# Patient Record
Sex: FEMALE | Race: WHITE | NOT HISPANIC OR LATINO | Employment: OTHER | URBAN - METROPOLITAN AREA
[De-identification: names, ages, dates, MRNs, and addresses within clinical notes are randomized per-mention and may not be internally consistent; named-entity substitution may affect disease eponyms.]

---

## 2019-07-12 PROBLEM — H43.811 POSTERIOR VITREOUS DETACHMENT: Noted: 2019-07-12

## 2019-07-12 PROBLEM — H40.033 ANATOMIC NARROW ANGLE S/P PI: Noted: 2019-07-12

## 2019-07-12 PROBLEM — H18.511 ENDOTHELIAL CORNEAL DYSTROPHY: Noted: 2020-12-04

## 2019-09-19 PROBLEM — H43.811 POSTERIOR VITREOUS DETACHMENT: Noted: 2019-07-12

## 2019-09-19 PROBLEM — H40.033 ANATOMIC NARROW ANGLES: Noted: 2019-07-12

## 2019-09-19 PROBLEM — H18.519 ENDOTHELIAL CORNEAL DYSTROPHY: Noted: 2020-12-04

## 2020-12-04 ENCOUNTER — PREPPED CHART (OUTPATIENT)
Dept: URBAN - METROPOLITAN AREA CLINIC 47 | Facility: CLINIC | Age: 65
End: 2020-12-04

## 2020-12-04 PROBLEM — H18.511 ENDOTHELIAL CORNEAL DYSTROPHY: Noted: 2020-12-04

## 2020-12-04 PROBLEM — H40.033 ANATOMIC NARROW ANGLE S/P PI: Noted: 2019-07-12

## 2020-12-04 PROBLEM — H43.811 POSTERIOR VITREOUS DETACHMENT: Noted: 2019-07-12

## 2022-07-07 ENCOUNTER — ESTABLISHED COMPREHENSIVE EXAM (OUTPATIENT)
Dept: URBAN - METROPOLITAN AREA CLINIC 47 | Facility: CLINIC | Age: 67
End: 2022-07-07

## 2022-07-07 DIAGNOSIS — H18.511: ICD-10-CM

## 2022-07-07 DIAGNOSIS — H52.03: ICD-10-CM

## 2022-07-07 DIAGNOSIS — H40.033: ICD-10-CM

## 2022-07-07 DIAGNOSIS — H43.811: ICD-10-CM

## 2022-07-07 PROCEDURE — 92014 COMPRE OPH EXAM EST PT 1/>: CPT

## 2022-07-07 PROCEDURE — 92015 DETERMINE REFRACTIVE STATE: CPT

## 2022-07-07 ASSESSMENT — TONOMETRY
OD_IOP_MMHG: 16.0
OS_IOP_MMHG: 20.0

## 2022-07-07 ASSESSMENT — VISUAL ACUITY
OD_CC: 20/30-1
OU_CC: 20/25-3
OS_CC: 20/40
OD_CC: 20/25-1
OS_CC: 20/25-2
OU_CC: 20/20-2

## 2022-07-07 ASSESSMENT — KERATOMETRY
OD_AXISANGLE_DEGREES: 103
OS_K2POWER_DIOPTERS: 45.50
OS_K1POWER_DIOPTERS: 44.75
OD_K1POWER_DIOPTERS: 44.75
OS_AXISANGLE_DEGREES: 61
OS_AXISANGLE2_DEGREES: 151
OD_K2POWER_DIOPTERS: 47.00
OD_AXISANGLE2_DEGREES: 013

## 2023-08-24 ENCOUNTER — ESTABLISHED COMPREHENSIVE EXAM (OUTPATIENT)
Dept: URBAN - METROPOLITAN AREA CLINIC 47 | Facility: CLINIC | Age: 68
End: 2023-08-24

## 2023-08-24 DIAGNOSIS — H52.03: ICD-10-CM

## 2023-08-24 DIAGNOSIS — H43.811: ICD-10-CM

## 2023-08-24 DIAGNOSIS — H40.033: ICD-10-CM

## 2023-08-24 DIAGNOSIS — H18.511: ICD-10-CM

## 2023-08-24 PROCEDURE — 92014 COMPRE OPH EXAM EST PT 1/>: CPT

## 2023-08-24 PROCEDURE — 92015 DETERMINE REFRACTIVE STATE: CPT

## 2023-08-24 ASSESSMENT — KERATOMETRY
OD_K1POWER_DIOPTERS: 44.75
OS_AXISANGLE_DEGREES: 61
OD_AXISANGLE2_DEGREES: 013
OS_K2POWER_DIOPTERS: 45.50
OD_K2POWER_DIOPTERS: 47.00
OD_AXISANGLE_DEGREES: 103
OS_K1POWER_DIOPTERS: 44.75
OS_AXISANGLE2_DEGREES: 151

## 2023-08-24 ASSESSMENT — VISUAL ACUITY
OS_CC: 20/20
OD_CC: 20/25

## 2024-08-27 ENCOUNTER — ESTABLISHED COMPREHENSIVE EXAM (OUTPATIENT)
Dept: URBAN - METROPOLITAN AREA CLINIC 47 | Facility: CLINIC | Age: 69
End: 2024-08-27

## 2024-08-27 DIAGNOSIS — H52.03: ICD-10-CM

## 2024-08-27 DIAGNOSIS — H25.13: ICD-10-CM

## 2024-08-27 DIAGNOSIS — H40.033: ICD-10-CM

## 2024-08-27 DIAGNOSIS — H43.811: ICD-10-CM

## 2024-08-27 DIAGNOSIS — H18.511: ICD-10-CM

## 2024-08-27 PROCEDURE — 92015 DETERMINE REFRACTIVE STATE: CPT

## 2024-08-27 PROCEDURE — 92014 COMPRE OPH EXAM EST PT 1/>: CPT

## 2024-08-27 ASSESSMENT — VISUAL ACUITY
OU_CC: 20/25
OD_CC: 20/30-2
OS_CC: 20/20-1

## 2024-08-27 ASSESSMENT — KERATOMETRY
OD_AXISANGLE_DEGREES: 104
OS_K1POWER_DIOPTERS: 44.75
OD_K2POWER_DIOPTERS: 47.00
OD_K1POWER_DIOPTERS: 45.75
OD_AXISANGLE_DEGREES: 103
OS_AXISANGLE2_DEGREES: 151
OS_K2POWER_DIOPTERS: 45.50
OD_AXISANGLE2_DEGREES: 013
OS_AXISANGLE_DEGREES: 61
OS_K1POWER_DIOPTERS: 45.00
OD_K1POWER_DIOPTERS: 44.75
OD_AXISANGLE2_DEGREES: 14
OD_K2POWER_DIOPTERS: 47.25
OS_AXISANGLE_DEGREES: 52
OS_K2POWER_DIOPTERS: 46.00
OS_AXISANGLE2_DEGREES: 142

## 2024-08-27 ASSESSMENT — TONOMETRY
OS_IOP_MMHG: 17
OD_IOP_MMHG: 19

## 2025-06-28 ENCOUNTER — OFFICE VISIT (OUTPATIENT)
Dept: URGENT CARE | Facility: CLINIC | Age: 70
End: 2025-06-28
Payer: COMMERCIAL

## 2025-06-28 VITALS
TEMPERATURE: 98 F | SYSTOLIC BLOOD PRESSURE: 155 MMHG | OXYGEN SATURATION: 99 % | HEART RATE: 88 BPM | WEIGHT: 155 LBS | RESPIRATION RATE: 19 BRPM | DIASTOLIC BLOOD PRESSURE: 90 MMHG

## 2025-06-28 DIAGNOSIS — Z23 ENCOUNTER FOR IMMUNIZATION: ICD-10-CM

## 2025-06-28 DIAGNOSIS — S01.81XA LACERATION OF OTHER PART OF HEAD WITHOUT FOREIGN BODY, INITIAL ENCOUNTER: Primary | ICD-10-CM

## 2025-06-28 DIAGNOSIS — S01.02XA LACERATION WITH FOREIGN BODY OF SCALP, INITIAL ENCOUNTER: ICD-10-CM

## 2025-06-28 PROBLEM — S01.91XA LACERATION OF HEAD WITHOUT FOREIGN BODY: Status: ACTIVE | Noted: 2025-06-28

## 2025-06-28 PROCEDURE — 90715 TDAP VACCINE 7 YRS/> IM: CPT

## 2025-06-28 PROCEDURE — 99204 OFFICE O/P NEW MOD 45 MIN: CPT | Performed by: PHYSICIAN ASSISTANT

## 2025-06-28 PROCEDURE — S9083 URGENT CARE CENTER GLOBAL: HCPCS | Performed by: PHYSICIAN ASSISTANT

## 2025-06-28 PROCEDURE — 12011 RPR F/E/E/N/L/M 2.5 CM/<: CPT | Performed by: PHYSICIAN ASSISTANT

## 2025-06-28 RX ORDER — ACETAMINOPHEN 325 MG/1
650 TABLET ORAL ONCE
Status: COMPLETED | OUTPATIENT
Start: 2025-06-28 | End: 2025-06-28

## 2025-06-28 RX ORDER — ALENDRONATE SODIUM 70 MG/1
70 TABLET ORAL
COMMUNITY
Start: 2025-06-13

## 2025-06-28 RX ADMIN — ACETAMINOPHEN 650 MG: 325 TABLET ORAL at 11:51

## 2025-06-28 NOTE — PATIENT INSTRUCTIONS
The laceration was able to be closed with glue.  I did discuss with the patient and her daughter-in-law about proceeding to the ER for further evaluation of head trauma.  Considering the [place] head trauma scale does recommend CT.  The patient declined.  I recommended that they watch for worsening headache, change in vision, confusion, vomiting, change in gait and proceed to the ER if any of this occurs.  Patient to keep area clean and dry.  Patient should not get the area wet for 24 hours.  We discussed not picking or pulling the glue off and letting it fall off naturally.  She can trim edges if they do flake up.  If tests have been performed at [facility], our office will contact you with results if changes need to be made to the care plan discussed with you at the visit.  You can review your full results on [facility]  Follow up with PCP in 3-5 days.  Proceed to  ER if symptoms worsen.

## 2025-06-28 NOTE — PROGRESS NOTES
[facility]        NAME: Shilpi Baig is a 70 y.o. female  : 1955    MRN: [id number]  DATE: 2025  TIME: 11:59 AM    Assessment and Plan   Laceration of other part of head without foreign body, initial encounter [S01.81XA]  1. Laceration of other part of head without foreign body, initial encounter  acetaminophen (TYLENOL) tablet 650 mg      2. Encounter for immunization  Tdap Vaccine greater than or equal to 8yo      3. Laceration with foreign body of scalp, initial encounter  Tdap Vaccine greater than or equal to 8yo            Patient Instructions   The laceration was able to be closed with glue.  I did discuss with the patient and her daughter-in-law about proceeding to the ER for further evaluation of head trauma.  Considering the [place] head trauma scale does recommend CT.  The patient declined.  I recommended that they watch for worsening headache, change in vision, confusion, vomiting, change in gait and proceed to the ER if any of this occurs.  Patient to keep area clean and dry.  Patient should not get the area wet for 24 hours.  We discussed not picking or pulling the glue off and letting it fall off naturally.  She can trim edges if they do flake up. TDAP updated.   If tests have been performed at [facility], our office will contact you with results if changes need to be made to the care plan discussed with you at the visit.  You can review your full results on [facility]  Follow up with PCP in 3-5 days.  Proceed to  ER if symptoms worsen.    Chief Complaint     Chief Complaint   Patient presents with    Head Laceration     Head lac, hit with ceiling fan, This am.          History of Present Illness       Head Laceration  Associated symptoms include headaches. Pertinent negatives include no chest pain, fever or vomiting.     This is a 70-year-old female here complaining of a laceration to her forehead.  She notes she is staying in an air [place] and leaned over the balcony to  look at her grandson when the ceiling fan hit her in the head.  She notes this happened approximately 9 AM this morning.  She does note that she has a headache which she rates 7 out of 10.  She denies any anticoagulation use, loss of consciousness, change in vision, vomiting, sensitivity to light, shortness of breath, chest pain, fever.  Her daughter-in-law is with her and denies any change in gait or confusion.  Patient is unsure when her last tetanus was.  Review of Systems   Review of Systems   Constitutional:  Negative for fever.   Eyes:  Negative for photophobia.   Respiratory:  Negative for shortness of breath.    Cardiovascular:  Negative for chest pain.   Gastrointestinal:  Negative for diarrhea and vomiting.   Skin:  Positive for wound.   Neurological:  Positive for light-headedness and headaches.         Current Medications     Current Medications[1]    Current Allergies     Allergies as of 06/28/2025    (No Known Allergies)            The following portions of the patient's history were reviewed and updated as appropriate: allergies, current medications, past family history, past medical history, past social history, past surgical history and problem list.     Past Medical History[2]    Past Surgical History[3]    Family History[4]      Medications have been verified.        Objective   /90   Pulse 88   Temp 98 °F (36.7 °C)   Resp 19   Wt 70.3 kg (155 lb)   SpO2 99%        Physical Exam     Physical Exam  Constitutional:       General: She is not in acute distress.     Appearance: Normal appearance. She is not ill-appearing, toxic-appearing or diaphoretic.     Cardiovascular:      Rate and Rhythm: Normal rate and regular rhythm.   Pulmonary:      Effort: Pulmonary effort is normal.      Breath sounds: Normal breath sounds.     Skin:     Comments: There is a 1 cm laceration near the start of the hairline on the right side of the forehead.  It is not actively bleeding.  There is no surrounding  edema or ecchymosis.  There is no erythema.  There is no foreign body.     Neurological:      Mental Status: She is alert.               Universal Protocol:  Consent: Verbal consent obtained  Risks and benefits: risks, benefits and alternatives were discussed  Consent given by: patient  Patient understanding: patient states understanding of the procedure being performed  Patient identity confirmed: verbally with patient  Laceration repair    Date/Time: 6/28/2025 10:00 AM    Performed by: Leeann Richardson PA-C  Authorized by: Leeann Richardson PA-C  Body area: head/neck  Location details: forehead  Laceration length: 1 cm  Foreign bodies: no foreign bodies    Wound Dehiscence:  Superficial Wound Dehiscence: simple closure      Procedure Details:  Preparation: Patient was prepped and draped in the usual sterile fashion.  Irrigation solution: saline  Skin closure: glue  Approximation: close  Approximation difficulty: simple  Patient tolerance: patient tolerated the procedure well with no immediate complications                   [1]   Current Outpatient Medications:     alendronate (FOSAMAX) 70 mg tablet, Take 70 mg by mouth every 7 days, Disp: , Rfl:   No current facility-administered medications for this visit.  [2] No past medical history on file.  [3] No past surgical history on file.  [4] No family history on file.